# Patient Record
Sex: MALE | Race: WHITE | NOT HISPANIC OR LATINO
[De-identification: names, ages, dates, MRNs, and addresses within clinical notes are randomized per-mention and may not be internally consistent; named-entity substitution may affect disease eponyms.]

---

## 2017-07-06 PROBLEM — Z00.00 ENCOUNTER FOR PREVENTIVE HEALTH EXAMINATION: Status: ACTIVE | Noted: 2017-07-06

## 2017-07-10 ENCOUNTER — APPOINTMENT (OUTPATIENT)
Dept: ORTHOPEDIC SURGERY | Facility: CLINIC | Age: 37
End: 2017-07-10

## 2017-07-11 ENCOUNTER — OTHER (OUTPATIENT)
Age: 37
End: 2017-07-11

## 2017-07-17 ENCOUNTER — APPOINTMENT (OUTPATIENT)
Dept: ORTHOPEDIC SURGERY | Facility: CLINIC | Age: 37
End: 2017-07-17

## 2017-07-17 VITALS
BODY MASS INDEX: 27.92 KG/M2 | SYSTOLIC BLOOD PRESSURE: 137 MMHG | TEMPERATURE: 99 F | WEIGHT: 195 LBS | DIASTOLIC BLOOD PRESSURE: 90 MMHG | HEART RATE: 63 BPM | HEIGHT: 70 IN

## 2017-07-17 DIAGNOSIS — Z78.9 OTHER SPECIFIED HEALTH STATUS: ICD-10-CM

## 2017-09-18 ENCOUNTER — APPOINTMENT (OUTPATIENT)
Dept: ORTHOPEDIC SURGERY | Facility: CLINIC | Age: 37
End: 2017-09-18

## 2019-01-03 ENCOUNTER — APPOINTMENT (OUTPATIENT)
Dept: ORTHOPEDIC SURGERY | Facility: CLINIC | Age: 39
End: 2019-01-03
Payer: COMMERCIAL

## 2019-01-03 VITALS
DIASTOLIC BLOOD PRESSURE: 89 MMHG | WEIGHT: 195 LBS | HEIGHT: 70 IN | SYSTOLIC BLOOD PRESSURE: 142 MMHG | HEART RATE: 78 BPM | BODY MASS INDEX: 27.92 KG/M2

## 2019-01-03 PROCEDURE — 73030 X-RAY EXAM OF SHOULDER: CPT | Mod: RT

## 2019-01-03 PROCEDURE — 99213 OFFICE O/P EST LOW 20 MIN: CPT

## 2019-03-11 ENCOUNTER — APPOINTMENT (OUTPATIENT)
Dept: ORTHOPEDIC SURGERY | Facility: CLINIC | Age: 39
End: 2019-03-11
Payer: COMMERCIAL

## 2019-03-11 VITALS
TEMPERATURE: 99.1 F | WEIGHT: 195 LBS | HEART RATE: 72 BPM | DIASTOLIC BLOOD PRESSURE: 81 MMHG | SYSTOLIC BLOOD PRESSURE: 137 MMHG | BODY MASS INDEX: 27.92 KG/M2 | HEIGHT: 70 IN

## 2019-03-11 PROCEDURE — 99213 OFFICE O/P EST LOW 20 MIN: CPT

## 2019-03-11 NOTE — PHYSICAL EXAM
[de-identified] : Physical Exam:\par General: Well appearing, no acute distress, A&O\par Neurologic: A&Ox3, No focal deficits\par Head: NCAT without abrasions, lacerations, or ecchymosis to head, face, or scalp\par Eyes: No scleral icterus, no gross abnormalities\par Respiratory: Equal chest wall expansion bilaterally, no accessory muscle use\par Lymphatic: No lymphadenopathy palpated\par Skin: Warm and dry\par Psychiatric: Normal mood and affect\par Right Shoulder\par ·	Inspection/Palpation: no tenderness, swelling or deformities\par ·	Range of Motion: full and painless in all planes, no crepitus\par ·	Strength: forward elevation in scapular plane 5/5, internal rotation 5/5, external rotation 5/5, adduction 5/5 and abduction 5/5\par ·	Stability: no joint instability on provocative testing\par Tests: Perkins test negative, Neer sign negative, negative drop arm test secondary to pain, bear hug test negative, Napolean sign negative, cross arm adduction negative, lift off sign positive, hornblowers sign negative, speeds test negative, Yergason's test negative, no bicipital groove tenderness, Burns's Active Compression test negative\par \par Left Shoulder\par ·	Inspection/Palpation: no tenderness, swelling or deformities\par ·	Range of Motion: full and painless in all planes, no crepitus\par ·	Strength: forward elevation in scapular plane 5/5, internal rotation 5/5, external rotation 5/5, adduction 5/5 and abduction 5/5\par ·	Stability: no joint instability on provocative testing\par Tests: Perkins test negative, Neer sign negative, negative drop arm test secondary to pain, bear hug test negative, Napolean sign negative, cross arm adduction negative, lift off sign positive, hornblowers sign negative, speeds test negative, Yergason's test negative, no bicipital groove tenderness, Burns's Active Compression test negative

## 2019-03-11 NOTE — DISCUSSION/SUMMARY
[de-identified] : Ammon is a 38-year-old male coming in for followup after complaining of significant right shoulder pain. He has been in no pain since resting it after the injury in December. He has full range of motion of shoulder with no pain on exam. At this time he may continue to go back to working out. I will see him back p.r.n. He agrees with the above plan and all questions were

## 2019-03-11 NOTE — HISTORY OF PRESENT ILLNESS
[de-identified] : Pt. is 38 y.o RHD male with c/o R shoulder pain that began several years ago.  Patient painted his ceiling following working out at the gym and experienced R shoulder pain.  He performed physical therapy which alleviated the pain.  Recently he was performing IR/ER with a weight in his hand to warm up his shoulder at the gym and his shoulder pain returned.  Pt. was seen by Rylan MENDOZA who referred him to Dr. Luciano to obtain  a rx. for physical therapy.  Currently the patient is not experiencing pain.

## 2019-04-08 ENCOUNTER — APPOINTMENT (OUTPATIENT)
Dept: ORTHOPEDIC SURGERY | Facility: CLINIC | Age: 39
End: 2019-04-08
Payer: COMMERCIAL

## 2019-04-08 PROCEDURE — 99213 OFFICE O/P EST LOW 20 MIN: CPT

## 2019-04-08 NOTE — REVIEW OF SYSTEMS
[Joint Pain] : joint pain [Joint Stiffness] : joint stiffness [Negative] : Heme/Lymph [FreeTextEntry9] : right shoulder

## 2019-04-08 NOTE — HISTORY OF PRESENT ILLNESS
[de-identified] : Ammon comes in for followup of his right shoulder. He states he's had significant pain in his shoulder with catching and locking while going back exercises. Overall is unhappy with my 2 get further imaging to assess his shoulder. He has had this pain and symptoms for the past 6 months.

## 2019-04-08 NOTE — DISCUSSION/SUMMARY
[de-identified] : 38-year-old male right shoulder pain secondary to a possible SLAP tear versus rotator cuff tendinitis. He has attempted conservative measures over the last 6 months with physical therapy and oral anti-inflammatories and is completely refractory. At this time I recommend an MRI to rule out a SLAP tear. I will call him once the MRI is completed to discuss the results and further treatment plans. He agrees with the above plan and all questions were answered

## 2019-04-08 NOTE — PHYSICAL EXAM
[de-identified] : Physical Exam:\par General: Well appearing, no acute distress, A&O\par Neurologic: A&Ox3, No focal deficits\par Head: NCAT without abrasions, lacerations, or ecchymosis to head, face, or scalp\par Eyes: No scleral icterus, no gross abnormalities\par Respiratory: Equal chest wall expansion bilaterally, no accessory muscle use\par Lymphatic: No lymphadenopathy palpated\par Skin: Warm and dry\par Psychiatric: Normal mood and affect\par Right Shoulder\par ·	Inspection/Palpation: no tenderness, swelling or deformities\par ·	Range of Motion: full and painless in all planes, no crepitus\par ·	Strength: forward elevation in scapular plane 5/5, internal rotation 5/5, external rotation 5/5, adduction 5/5 and abduction 5/5\par ·	Stability: no joint instability on provocative testing\par Tests: Perkins test negative, Neer sign negative, negative drop arm test secondary to pain, bear hug test negative, Napolean sign negative, cross arm adduction negative, lift off sign positive, hornblowers sign negative, speeds test negative, Yergason's test negative, no bicipital groove tenderness, Burns's Active Compression test negative\par \par Left Shoulder\par ·	Inspection/Palpation: no tenderness, swelling or deformities\par ·	Range of Motion: full and painless in all planes, no crepitus\par ·	Strength: forward elevation in scapular plane 5/5, internal rotation 5/5, external rotation 5/5, adduction 5/5 and abduction 5/5\par ·	Stability: no joint instability on provocative testing\par Tests: Perkins test negative, Neer sign negative, negative drop arm test secondary to pain, bear hug test negative, Napolean sign negative, cross arm adduction negative, lift off sign positive, hornblowers sign negative, speeds test negative, Yergason's test negative, no bicipital groove tenderness, Burns's Active Compression test negative

## 2019-04-16 ENCOUNTER — APPOINTMENT (OUTPATIENT)
Dept: MRI IMAGING | Facility: CLINIC | Age: 39
End: 2019-04-16

## 2019-05-05 ENCOUNTER — FORM ENCOUNTER (OUTPATIENT)
Age: 39
End: 2019-05-05

## 2019-05-06 ENCOUNTER — APPOINTMENT (OUTPATIENT)
Dept: MRI IMAGING | Facility: CLINIC | Age: 39
End: 2019-05-06
Payer: COMMERCIAL

## 2019-05-06 ENCOUNTER — OUTPATIENT (OUTPATIENT)
Dept: OUTPATIENT SERVICES | Facility: HOSPITAL | Age: 39
LOS: 1 days | End: 2019-05-06
Payer: COMMERCIAL

## 2019-05-06 DIAGNOSIS — S46.911A STRAIN OF UNSPECIFIED MUSCLE, FASCIA AND TENDON AT SHOULDER AND UPPER ARM LEVEL, RIGHT ARM, INITIAL ENCOUNTER: ICD-10-CM

## 2019-05-06 PROCEDURE — 73221 MRI JOINT UPR EXTREM W/O DYE: CPT | Mod: 26,RT

## 2019-05-06 PROCEDURE — 73221 MRI JOINT UPR EXTREM W/O DYE: CPT

## 2020-08-31 ENCOUNTER — APPOINTMENT (OUTPATIENT)
Dept: ORTHOPEDIC SURGERY | Facility: CLINIC | Age: 40
End: 2020-08-31
Payer: COMMERCIAL

## 2020-08-31 PROCEDURE — 99214 OFFICE O/P EST MOD 30 MIN: CPT

## 2020-08-31 RX ORDER — MELOXICAM 7.5 MG/1
7.5 TABLET ORAL
Qty: 21 | Refills: 0 | Status: COMPLETED | COMMUNITY
Start: 2020-08-31 | End: 2020-09-21

## 2020-08-31 NOTE — PHYSICAL EXAM
[de-identified] : Physical Exam:\par General: Well appearing, no acute distress, A&O\par Neurologic: A&Ox3, No focal deficits\par Head: NCAT without abrasions, lacerations, or ecchymosis to head, face, or scalp\par Eyes: No scleral icterus, no gross abnormalities\par Respiratory: Equal chest wall expansion bilaterally, no accessory muscle use\par Lymphatic: No lymphadenopathy palpated\par Skin: Warm and dry\par Psychiatric: Normal mood and affect\par Right Shoulder\par ·	Inspection/Palpation: no tenderness, swelling or deformities\par ·	Range of Motion: full and painless in all planes, no crepitus\par ·	Strength: forward elevation in scapular plane 5/5, internal rotation 5/5, external rotation 5/5, adduction 5/5 and abduction 5/5\par ·	Stability: no joint instability on provocative testing\par Tests: Perkins test negative, Neer sign negative, negative drop arm test secondary to pain, bear hug test negative, Napolean sign negative, cross arm adduction negative, lift off sign positive, hornblowers sign negative, speeds test negative, Yergason's test negative, no bicipital groove tenderness, Burns's Active Compression test negative\par \par Left Shoulder\par ·	Inspection/Palpation: no tenderness, swelling or deformities\par ·	Range of Motion: full and painless in all planes, no crepitus\par ·	Strength: forward elevation in scapular plane 5/5, internal rotation 5/5, external rotation 5/5, adduction 5/5 and abduction 5/5\par ·	Stability: no joint instability on provocative testing\par Tests: Perkins test negative, Neer sign negative, negative drop arm test secondary to pain, bear hug test negative, Napolean sign negative, cross arm adduction negative, lift off sign positive, hornblowers sign negative, speeds test negative, Yergason's test negative, no bicipital groove tenderness, Burns's Active Compression test negative [de-identified] : MRI to right shoulder:   Tendinosis of the supraspinatus without tear. No rotator cuff tear.  * SLAP tear as described above with associated superior paralabral cyst measuring 0.4 x 0.6 x 0.7 cm.

## 2020-08-31 NOTE — DISCUSSION/SUMMARY
[de-identified] : Ammon comes in for followup of his right shoulder. He states he's had improvement in his symptoms since performing PT and requests a new script at this time. He has not seen us since April. He wasn't able to perform PT 2x/week since seeing us last due to COVID. He states his therapist tells him he is "tight" with forward flexion. His pain is dull and deep with the joint. He admits to clicking of his shoulder regularly. He admits to working as a  and denies constant pain but with lifting at work it hurts him. He denies injection or Mobic for this issue. Due to this, after a thorough discussion regarding the nature of his symptoms and all treatment options, pt requires Mobic x 21 days alongside restarting formal PT. If this doesn’t alleviate his symptoms entirely he will perform the u/s guided injection that was ordered for him. He was given these prescriptions today. We will see him back for clinical reassessment in 6-8 weeks. If he is refractory, we will discuss surgery. He agrees with the above plan and all questions were answered.\par

## 2020-08-31 NOTE — HISTORY OF PRESENT ILLNESS
[de-identified] : Ammon comes in for followup of his right shoulder. He states he's had improvement in his symptoms since performing PT and requests a new script at this time. He has not seen us since April. He wasn't able to perform PT 2x/week since seeing us last due to COVID. He states his therapist tells him he is "tight" with forward flexion. His pain is dull and deep with the joint. He admits to clicking of his shoulder regularly. He admits to working as a  and denies constant pain but with lifting at work it hurts him. He denies injection or Mobic for this issue.

## 2020-08-31 NOTE — REVIEW OF SYSTEMS
[Joint Pain] : joint pain [Joint Stiffness] : joint stiffness [Negative] : Endocrine [FreeTextEntry9] : right shoulder

## 2020-09-15 ENCOUNTER — RESULT REVIEW (OUTPATIENT)
Age: 40
End: 2020-09-15

## 2020-09-15 ENCOUNTER — APPOINTMENT (OUTPATIENT)
Dept: ULTRASOUND IMAGING | Facility: CLINIC | Age: 40
End: 2020-09-15
Payer: COMMERCIAL

## 2020-09-15 ENCOUNTER — OUTPATIENT (OUTPATIENT)
Dept: OUTPATIENT SERVICES | Facility: HOSPITAL | Age: 40
LOS: 1 days | End: 2020-09-15
Payer: COMMERCIAL

## 2020-09-15 DIAGNOSIS — Z00.8 ENCOUNTER FOR OTHER GENERAL EXAMINATION: ICD-10-CM

## 2020-09-15 DIAGNOSIS — S43.431D SUPERIOR GLENOID LABRUM LESION OF RIGHT SHOULDER, SUBSEQUENT ENCOUNTER: ICD-10-CM

## 2020-09-15 DIAGNOSIS — M67.819 OTHER SPECIFIED DISORDERS OF SYNOVIUM AND TENDON, UNSPECIFIED SHOULDER: ICD-10-CM

## 2020-09-15 DIAGNOSIS — S73.191D OTHER SPRAIN OF RIGHT HIP, SUBSEQUENT ENCOUNTER: ICD-10-CM

## 2020-09-15 PROCEDURE — 20611 DRAIN/INJ JOINT/BURSA W/US: CPT

## 2020-09-15 PROCEDURE — 20611 DRAIN/INJ JOINT/BURSA W/US: CPT | Mod: RT

## 2020-11-10 ENCOUNTER — NON-APPOINTMENT (OUTPATIENT)
Age: 40
End: 2020-11-10

## 2020-11-12 ENCOUNTER — APPOINTMENT (OUTPATIENT)
Dept: ORTHOPEDIC SURGERY | Facility: CLINIC | Age: 40
End: 2020-11-12

## 2020-11-17 ENCOUNTER — NON-APPOINTMENT (OUTPATIENT)
Age: 40
End: 2020-11-17

## 2020-11-24 ENCOUNTER — APPOINTMENT (OUTPATIENT)
Dept: ORTHOPEDIC SURGERY | Facility: CLINIC | Age: 40
End: 2020-11-24

## 2020-12-14 ENCOUNTER — APPOINTMENT (OUTPATIENT)
Dept: ORTHOPEDIC SURGERY | Facility: CLINIC | Age: 40
End: 2020-12-14
Payer: COMMERCIAL

## 2020-12-14 PROCEDURE — 99214 OFFICE O/P EST MOD 30 MIN: CPT

## 2020-12-14 PROCEDURE — 99072 ADDL SUPL MATRL&STAF TM PHE: CPT

## 2020-12-17 NOTE — PHYSICAL EXAM
[de-identified] : Physical Exam:\par General: Well appearing, no acute distress\par Neurologic: A&Ox3, No focal deficits\par Head: NCAT without abrasions, lacerations, or ecchymosis to head, face, or scalp\par Eyes: No scleral icterus, no gross abnormalities\par Respiratory: Equal chest wall expansion bilaterally, no accessory muscle use\par Lymphatic: No lymphadenopathy palpated\par Skin: Warm and dry\par Psychiatric: Normal mood and affect\par \par Right Shoulder:\par ·	Inspection/Palpation: no tenderness, swelling or deformities\par ·	Range of Motion: full and painless in all planes, no crepitus; FF 0-170; ER at side 45; IR to T7\par ·	Strength: forward elevation in scapular plane [4/5], internal rotation [4/5], external rotation [4/5], adduction [4/5] and abduction [4/5]\par ·	Stability: load and shift test positive, apprehension test positive, relocation test positive, sulcus sign negative; Susi test [negative/positive], Jerk test [negative/positive]\par ·	Tests: Perkins negative, Neer negative, negative drop arm test, bear hug test negative, Napolean sign negative, cross arm adduction [negative/positive], lift off sign negative, hornblowers sign negative, speeds test [positive/negative], Yergason's test [positive/negative], bicipital groove tenderness [positive/negative], Burns's Active Compression test [positive/negative], bicep's load II test [negative/positive]\par \par Left Shoulder:\par ·	Inspection/Palpation: no tenderness, swelling or deformities\par ·	Range of Motion: full and painless in all planes, no crepitus\par ·	Strength: forward elevation in scapular plane 5/5, internal rotation 5/5, external rotation 5/5, adduction 5/5 and abduction 5/5\par ·	Stability: no joint instability on provocative testing\par ·	Tests: Perkins test negative, Neer sign negative, negative drop arm test secondary to pain, bear hug test negative, Napolean sign negative, cross arm adduction negative, lift off sign positive, hornblowers sign negative, speeds test negative, Yergason's test negative, no bicipital groove tenderness, Burns's Active Compression test negative\par  [de-identified] : MRI to right shoulder:   Tendinosis of the supraspinatus without tear. No rotator cuff tear.  * SLAP tear as described above with associated superior paralabral cyst measuring 0.4 x 0.6 x 0.7 cm.

## 2020-12-17 NOTE — DISCUSSION/SUMMARY
[de-identified] : NAMITA is a 40 year male who presents today for follow up for right shoulder pain that has not improved with conservative management. Patient is here to discuss surgical intervention at this time. Patient admits to PT ending in September without improvement in his symptoms. He took mobic at that time as well. He denies cortisone injections in the past for this complaint. He admits to working as a  and denies constant pain but with lifting at work it hurts him. Previously reviewed MRI to right shoulder revealed tendinosis of the supraspinatus without tear. No rotator cuff tear.  SLAP tear with associated superior paralabral cyst measuring 0.4 x 0.6 x 0.7 cm. Due to this, after a thorough discussion regarding the nature of his symptoms and all treatment options, pt at this time is considering surgical intervention.\par \par Patient has tried and failed all conservative treatment options. All the risks and benefits of a right shoulder arthroscopy with cyst removal, extensive debridement, SAD, acromioplasty, and open biceps tenodesis were discussed with the patient. Risks include, but are not limited to, infection, bleeding, dislocation, nerve injury, blood clots and other possible medical complications, which were discussed with the patient. Also the risks of possible readmission were discussed with the patient. Patient completely understands all risks and benefits. The need for postoperative DVT prophylaxis discussed with the patient as well. The patient was given no assurances that all the symptoms will be alleviated. Patient completely understands all this. He has been advised to get medical clearance before the procedure, in order to decrease complication risk. I had my surgical coordinator meet with the patient to discuss surgical dates. He agrees with the above plan and all questions were answered.

## 2020-12-17 NOTE — HISTORY OF PRESENT ILLNESS
[de-identified] : NAMITA is a 40 year male who presents today for follow up for right shoulder pain that has not improved with conservative management. Patient is here to discuss surgical intervention at this time. Patient admits to PT ending in September without improvement in his symptoms. He took mobic at that time as well. He denies cortisone injections in the past for this complaint.

## 2020-12-22 ENCOUNTER — APPOINTMENT (OUTPATIENT)
Dept: FAMILY MEDICINE | Facility: CLINIC | Age: 40
End: 2020-12-22
Payer: COMMERCIAL

## 2020-12-22 VITALS
BODY MASS INDEX: 28.06 KG/M2 | WEIGHT: 196 LBS | DIASTOLIC BLOOD PRESSURE: 80 MMHG | HEIGHT: 70 IN | OXYGEN SATURATION: 98 % | TEMPERATURE: 97.9 F | SYSTOLIC BLOOD PRESSURE: 130 MMHG | HEART RATE: 80 BPM

## 2020-12-22 DIAGNOSIS — Z13.31 ENCOUNTER FOR SCREENING FOR DEPRESSION: ICD-10-CM

## 2020-12-22 DIAGNOSIS — Z76.89 PERSONS ENCOUNTERING HEALTH SERVICES IN OTHER SPECIFIED CIRCUMSTANCES: ICD-10-CM

## 2020-12-22 PROCEDURE — G0442 ANNUAL ALCOHOL SCREEN 15 MIN: CPT

## 2020-12-22 PROCEDURE — 99204 OFFICE O/P NEW MOD 45 MIN: CPT | Mod: 25

## 2020-12-22 PROCEDURE — 99072 ADDL SUPL MATRL&STAF TM PHE: CPT

## 2020-12-22 NOTE — HISTORY OF PRESENT ILLNESS
[FreeTextEntry1] : establish care  [de-identified] : 40 year old male with pmhx of SLAP tear with cyst presents to establish care. He has surgery scheduled feb 12 and needs PCP. He otherwise feels well. Has no other concerns today. 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 Shoulder has been bothering him for years. Non invasive treatment has been unsuccessful.

## 2020-12-22 NOTE — PHYSICAL EXAM
[No Acute Distress] : no acute distress [Well-Appearing] : well-appearing [Normal Sclera/Conjunctiva] : normal sclera/conjunctiva [PERRL] : pupils equal round and reactive to light [Normal Outer Ear/Nose] : the outer ears and nose were normal in appearance [Normal TMs] : both tympanic membranes were normal [No Lymphadenopathy] : no lymphadenopathy [Supple] : supple [No Respiratory Distress] : no respiratory distress  [No Accessory Muscle Use] : no accessory muscle use [Clear to Auscultation] : lungs were clear to auscultation bilaterally [Normal Rate] : normal rate  [Regular Rhythm] : with a regular rhythm [Soft] : abdomen soft [Non Tender] : non-tender [Non-distended] : non-distended [Normal Bowel Sounds] : normal bowel sounds [No Rash] : no rash [Coordination Grossly Intact] : coordination grossly intact [No Focal Deficits] : no focal deficits [Normal Affect] : the affect was normal [Normal Insight/Judgement] : insight and judgment were intact

## 2020-12-22 NOTE — PLAN
[FreeTextEntry1] : SLAP tear\par - advised he will need PST and medical clearance appt. Will f/u with surgical coordinator \par \par HCM\par - encourage healthy diet and exercise

## 2020-12-22 NOTE — HEALTH RISK ASSESSMENT
[Yes] : Yes [Monthly or less (1 pt)] : Monthly or less (1 point) [1 or 2 (0 pts)] : 1 or 2 (0 points) [Never (0 pts)] : Never (0 points) [0] : 2) Feeling down, depressed, or hopeless: Not at all (0) [] : No [Audit-CScore] : 1 [de-identified] : limited, active at work [de-identified] : balanced  [[id number]] : 0

## 2021-02-01 ENCOUNTER — APPOINTMENT (OUTPATIENT)
Dept: FAMILY MEDICINE | Facility: CLINIC | Age: 41
End: 2021-02-01

## 2021-02-09 ENCOUNTER — APPOINTMENT (OUTPATIENT)
Dept: FAMILY MEDICINE | Facility: CLINIC | Age: 41
End: 2021-02-09
Payer: COMMERCIAL

## 2021-02-09 VITALS
WEIGHT: 195 LBS | HEART RATE: 76 BPM | SYSTOLIC BLOOD PRESSURE: 130 MMHG | HEIGHT: 70 IN | DIASTOLIC BLOOD PRESSURE: 82 MMHG | OXYGEN SATURATION: 98 % | TEMPERATURE: 97.9 F | BODY MASS INDEX: 27.92 KG/M2

## 2021-02-09 DIAGNOSIS — Z01.818 ENCOUNTER FOR OTHER PREPROCEDURAL EXAMINATION: ICD-10-CM

## 2021-02-09 PROCEDURE — 99072 ADDL SUPL MATRL&STAF TM PHE: CPT

## 2021-02-09 PROCEDURE — 99214 OFFICE O/P EST MOD 30 MIN: CPT

## 2021-02-09 NOTE — PHYSICAL EXAM
[No Acute Distress] : no acute distress [Well-Appearing] : well-appearing [Normal Sclera/Conjunctiva] : normal sclera/conjunctiva [PERRL] : pupils equal round and reactive to light [Normal Outer Ear/Nose] : the outer ears and nose were normal in appearance [Normal TMs] : both tympanic membranes were normal [No Lymphadenopathy] : no lymphadenopathy [Supple] : supple [No Respiratory Distress] : no respiratory distress  [No Accessory Muscle Use] : no accessory muscle use [Clear to Auscultation] : lungs were clear to auscultation bilaterally [Soft] : abdomen soft [Non Tender] : non-tender [Non-distended] : non-distended [Normal Bowel Sounds] : normal bowel sounds [No Joint Swelling] : no joint swelling [Grossly Normal Strength/Tone] : grossly normal strength/tone [No Rash] : no rash [No Focal Deficits] : no focal deficits [Normal Affect] : the affect was normal [Normal Insight/Judgement] : insight and judgment were intact

## 2021-02-09 NOTE — HISTORY OF PRESENT ILLNESS
[No Pertinent Cardiac History] : no history of aortic stenosis, atrial fibrillation, coronary artery disease, recent myocardial infarction, or implantable device/pacemaker [No Pertinent Pulmonary History] : no history of asthma, COPD, sleep apnea, or smoking [Self] : no previous adverse anesthesia reaction [Chronic Anticoagulation] : no chronic anticoagulation [Chronic Kidney Disease] : no chronic kidney disease [Diabetes] : no diabetes [FreeTextEntry1] : R shoulder arthroscopy  [FreeTextEntry2] : 2/12/21 [FreeTextEntry3] : Dr. Luciano  [FreeTextEntry4] : 40 year old male with no sig pmhx presents for medical clearance for R shoulder arthroscopy. He is feeling well today with no concerns. Able to walk up a flight of stairs without symptoms. Is very active daily.  [FreeTextEntry8] : METS > 4

## 2021-02-09 NOTE — ASSESSMENT
[Patient Optimized for Surgery] : Patient optimized for surgery [No Further Testing Recommended] : no further testing recommended [FreeTextEntry4] : Low risk pt for intermediate risk procedure. He is medically optimized at this time.

## 2021-02-12 ENCOUNTER — APPOINTMENT (OUTPATIENT)
Dept: ORTHOPEDIC SURGERY | Facility: AMBULATORY SURGERY CENTER | Age: 41
End: 2021-02-12
Payer: COMMERCIAL

## 2021-02-12 ENCOUNTER — NON-APPOINTMENT (OUTPATIENT)
Age: 41
End: 2021-02-12

## 2021-02-12 PROCEDURE — 29826 SHO ARTHRS SRG DECOMPRESSION: CPT | Mod: RT

## 2021-02-12 PROCEDURE — 23430 REPAIR BICEPS TENDON: CPT | Mod: RT

## 2021-02-12 PROCEDURE — 29807 SHO ARTHRS SRG RPR SLAP LES: CPT | Mod: RT

## 2021-02-12 RX ORDER — ACETAMINOPHEN 500 MG/1
500 TABLET ORAL
Qty: 30 | Refills: 0 | Status: COMPLETED | COMMUNITY
Start: 2021-02-12 | End: 2021-02-22

## 2021-02-12 RX ORDER — OXYCODONE 5 MG/1
5 TABLET ORAL
Qty: 10 | Refills: 0 | Status: COMPLETED | COMMUNITY
Start: 2021-02-12 | End: 2021-02-17

## 2021-02-12 RX ORDER — ONDANSETRON 4 MG/1
4 TABLET ORAL
Qty: 30 | Refills: 0 | Status: COMPLETED | COMMUNITY
Start: 2021-02-12 | End: 2021-02-17

## 2021-02-22 ENCOUNTER — APPOINTMENT (OUTPATIENT)
Dept: ORTHOPEDIC SURGERY | Facility: CLINIC | Age: 41
End: 2021-02-22
Payer: COMMERCIAL

## 2021-02-22 VITALS
DIASTOLIC BLOOD PRESSURE: 80 MMHG | SYSTOLIC BLOOD PRESSURE: 122 MMHG | WEIGHT: 195 LBS | HEIGHT: 70 IN | BODY MASS INDEX: 27.92 KG/M2 | HEART RATE: 84 BPM

## 2021-02-22 PROCEDURE — 99024 POSTOP FOLLOW-UP VISIT: CPT

## 2021-02-22 PROCEDURE — 73030 X-RAY EXAM OF SHOULDER: CPT | Mod: RT

## 2021-02-23 NOTE — HISTORY OF PRESENT ILLNESS
[Clean/Dry/Intact] : clean, dry and intact [Neuro Intact] : an unremarkable neurological exam [Vascular Intact] : ~T peripheral vascular exam normal [___ Weeks Post Op] : [unfilled] weeks post op [Chills] : no chills [Fever] : no fever [Nausea] : no nausea [Vomiting] : no vomiting [de-identified] : spo R shoulder arthroscopy and extensive debridement. DOS: 02/12/2021. [de-identified] : NAMITA ROSE is a 40 year male being seen for 1st p/o R shoulder arthroscopy and extensive debridement, 2 weeks ago.\par \par Patient denies numbness and tingling to the extremities. Patient reports discontinuing tylenol 6 days post surgery Patient has not started PT yet. [de-identified] : Right Shoulder\par \par Incisions are clean, dry and intact. No surrounding erythema. No drainage. Wound appears to be healing well. Unable to test ROM due to recent sx. Motor and sensation are intact distally. He has full range of motion of all fingers with capillary refill of <2 seconds throughout. He has good nerve function and median nerve, ulnar, radial, PIN, AIN. He has no sensory deficits over the C5-T1 nerve roots. Radial pulses 2+. Radial pulses 2+. Able to make an A-OK sign and thumbs up sign.\par  [de-identified] : 2 views of R shoulder were performed today and available for me to review. Results were discussed with the patient. They demonstrate no f/x, dislocation or other deformity. [de-identified] : NAMITA ROSE is a 40 year male being seen for 1st p/o R shoulder arthroscopy and extensive debridement, 2 weeks ago.\par \par Patient denies numbness and tingling to the extremities. Patient reports discontinuing tylenol 6 days post surgery Patient has not started PT yet.\par \par Surgical sites are clean dry and intact. The patient is to start PT at this time 2x/week until seeing us again in 4 weeks from now. He agrees with the above plan and all questions were answered.\par

## 2021-03-26 ENCOUNTER — APPOINTMENT (OUTPATIENT)
Dept: ORTHOPEDIC SURGERY | Facility: CLINIC | Age: 41
End: 2021-03-26
Payer: COMMERCIAL

## 2021-03-26 PROCEDURE — 99024 POSTOP FOLLOW-UP VISIT: CPT

## 2021-03-26 NOTE — HISTORY OF PRESENT ILLNESS
[___ Weeks Post Op] : [unfilled] weeks post op [Clean/Dry/Intact] : clean, dry and intact [Neuro Intact] : an unremarkable neurological exam [Vascular Intact] : ~T peripheral vascular exam normal [Doing Well] : is doing well [Excellent Pain Control] : has excellent pain control [Chills] : no chills [Fever] : no fever [Nausea] : no nausea [Vomiting] : no vomiting [de-identified] : spo R shoulder arthroscopy and extensive debridement. DOS: 02/12/2021. [de-identified] : NAMITA ROSE is a 40 year male being seen for 2nd  p/o R shoulder arthroscopy and extensive debridement, 6 weeks ago. He presents today out of his sling and is tolerating this well.\par \par Patient denies numbness and tingling to the extremities. He denies use of OTC pain medications. He reports going to PT 3x/week with improvement in ROM and pain. He is wondering when he will be able to go back to work as a . He has been avoiding internal rotation with therapy. [de-identified] : Right Shoulder\par \par Incisions are clean, dry and intact. No surrounding erythema. No drainage. Spitting stitch noted to anterior portal and posterior portal site. Removed under sterile technique and tolerated well with no sequelae. Wound appears to be healing well. Passive FF 0-100, ER to 10, IR not tested. Motor and sensation are intact distally. He has full range of motion of all fingers with capillary refill of <2 seconds throughout. He has good nerve function and median nerve, ulnar, radial, PIN, AIN. He has no sensory deficits over the C5-T1 nerve roots. Radial pulses 2+. Able to make an A-OK sign and thumbs up sign: able to flex/extend thumb, able to cross middle over index,  strength 5/5\par  [de-identified] : No new imaging obtained today. [de-identified] : NAMITA ROSE is a 40 year male being seen for 2nd p/o R shoulder arthroscopy and extensive debridement, 6 weeks ago. He presents today out of his sling and is tolerating this well.\par \par Patient denies numbness and tingling to the extremities. He denies use of OTC pain medications. He reports going to PT 3x/week with improvement in ROM and pain. He is wondering when he will be able to go back to work as a . He has been avoiding internal rotation with therapy.\par \par Surgical sites are clean dry and intact. The patient is to continue PT at this time 2x/week until seeing us again in 6 weeks from now. He agrees with the above plan and all questions were answered.

## 2021-04-23 ENCOUNTER — APPOINTMENT (OUTPATIENT)
Dept: FAMILY MEDICINE | Facility: CLINIC | Age: 41
End: 2021-04-23
Payer: COMMERCIAL

## 2021-04-23 PROCEDURE — 99213 OFFICE O/P EST LOW 20 MIN: CPT | Mod: 95

## 2021-04-23 NOTE — PLAN
[FreeTextEntry1] : Sinusitis\par - start abx and medrol dose broderick. \par - cont flonase and decongestant\par - Cont supportive measures including increased fluid intake\par - f/u if no relief\par

## 2021-04-23 NOTE — HISTORY OF PRESENT ILLNESS
[Home] : at home, [unfilled] , at the time of the visit. [Medical Office: ([facility])___] : at the medical office located in  [Verbal consent obtained from patient] : the patient, [unfilled] [FreeTextEntry8] : 40 year old male presents with concerns of sinus infection. Has had nasal pressure and R eye pressure for 2 weeks and it has been getting worse. Has been taking advil and nasal spray without relief . No fever or muscle aches. No cough

## 2021-05-03 ENCOUNTER — APPOINTMENT (OUTPATIENT)
Dept: ORTHOPEDIC SURGERY | Facility: CLINIC | Age: 41
End: 2021-05-03
Payer: COMMERCIAL

## 2021-05-03 VITALS
SYSTOLIC BLOOD PRESSURE: 131 MMHG | WEIGHT: 195 LBS | DIASTOLIC BLOOD PRESSURE: 85 MMHG | HEIGHT: 70 IN | HEART RATE: 77 BPM | BODY MASS INDEX: 27.92 KG/M2

## 2021-05-03 DIAGNOSIS — S43.431D SUPERIOR GLENOID LABRUM LESION OF RIGHT SHOULDER, SUBSEQUENT ENCOUNTER: ICD-10-CM

## 2021-05-03 PROCEDURE — 99024 POSTOP FOLLOW-UP VISIT: CPT

## 2021-05-03 NOTE — HISTORY OF PRESENT ILLNESS
[___ Weeks Post Op] : [unfilled] weeks post op [Clean/Dry/Intact] : clean, dry and intact [Neuro Intact] : an unremarkable neurological exam [Vascular Intact] : ~T peripheral vascular exam normal [Doing Well] : is doing well [Excellent Pain Control] : has excellent pain control [Chills] : no chills [Fever] : no fever [Nausea] : no nausea [Vomiting] : no vomiting [de-identified] : spo R shoulder arthroscopy and extensive debridement. DOS: 02/12/2021. [de-identified] : NAMITA ROSE is a 40 year male being seen for 3rd  p/o R shoulder arthroscopy and extensive debridement, 11 weeks ago. He presents today out of his sling and is tolerating this well.\par \par Patient denies numbness and tingling to the extremities. He denies use of OTC pain medications. He reports going to PT 3x/week with improvement in ROM and pain. He is wondering when he will be able to go back to work as a . He has been avoiding internal rotation with therapy. [de-identified] : Right Shoulder\par \par Incisions are clean, dry and intact. No surrounding erythema. No drainage. Spitting stitch noted to anterior portal and posterior portal site. Removed under sterile technique and tolerated well with no sequelae. Wound appears to be healing well. Passive FF 0-100, ER to 10, IR not tested. Motor and sensation are intact distally. He has full range of motion of all fingers with capillary refill of <2 seconds throughout. He has good nerve function and median nerve, ulnar, radial, PIN, AIN. He has no sensory deficits over the C5-T1 nerve roots. Radial pulses 2+. Able to make an A-OK sign and thumbs up sign: able to flex/extend thumb, able to cross middle over index,  strength 5/5\par  [de-identified] : No new imaging obtained today. [de-identified] : NAMITA ROSE is a 40 year male being seen for 3rd p/o R shoulder arthroscopy and extensive debridement, 11 weeks ago. He presents today out of his sling and is tolerating this well. He feels stronger on the right side of his surgical arm now than his left. He reports pain at times of abduction but otherwise doing extremely well in PT.\par \par Patient denies numbness and tingling to the extremities. He denies use of OTC pain medications. He reports going to PT 3x/week with improvement in ROM and pain. \par \par Surgical sites are clean dry and intact. The patient is to continue PT at this time 2x/week until seeing us again in 2 months alongside daily HEP from now. He may return back to work without restrictions at this time due to his improvements but advised not to do anything that will cause him pain. He should try to avoid heavy lifting or pushing of which he states he will be able to. He agrees with the above plan and all questions were answered.

## 2021-07-12 ENCOUNTER — APPOINTMENT (OUTPATIENT)
Dept: ORTHOPEDIC SURGERY | Facility: CLINIC | Age: 41
End: 2021-07-12
Payer: COMMERCIAL

## 2021-07-12 VITALS
SYSTOLIC BLOOD PRESSURE: 138 MMHG | HEART RATE: 71 BPM | WEIGHT: 195 LBS | DIASTOLIC BLOOD PRESSURE: 78 MMHG | HEIGHT: 70 IN | BODY MASS INDEX: 27.92 KG/M2

## 2021-07-12 PROCEDURE — 99072 ADDL SUPL MATRL&STAF TM PHE: CPT

## 2021-07-12 PROCEDURE — 99214 OFFICE O/P EST MOD 30 MIN: CPT

## 2021-07-12 RX ORDER — METHYLPREDNISOLONE 4 MG/1
4 TABLET ORAL
Qty: 1 | Refills: 0 | Status: DISCONTINUED | COMMUNITY
Start: 2021-04-23 | End: 2021-07-12

## 2021-07-12 RX ORDER — AMOXICILLIN AND CLAVULANATE POTASSIUM 500; 125 MG/1; MG/1
500-125 TABLET, FILM COATED ORAL
Qty: 14 | Refills: 0 | Status: DISCONTINUED | COMMUNITY
Start: 2021-04-23 | End: 2021-07-12

## 2021-07-12 NOTE — REVIEW OF SYSTEMS
[Negative] : Heme/Lymph [Joint Pain] : no joint pain [Joint Stiffness] : no joint stiffness [FreeTextEntry9] : aftercare: right shoulder

## 2021-07-12 NOTE — PHYSICAL EXAM
[de-identified] : Physical Exam:\par General: Well appearing, no acute distress\par Neurologic: A&Ox3, No focal deficits\par Head: NCAT without abrasions, lacerations, or ecchymosis to head, face, or scalp\par Eyes: No scleral icterus, no gross abnormalities\par Respiratory: Equal chest wall expansion bilaterally, no accessory muscle use\par Lymphatic: No lymphadenopathy palpated\par Skin: Warm and dry\par Psychiatric: Normal mood and affect\par \par Right Shoulder\par \par Incisions are clean, dry and intact. No surrounding erythema. No drainage. Wound fully healed well. Active FF 0-160, ER to 40, IR back pocket without pain. Motor and sensation are intact distally. He has full range of motion of all fingers with capillary refill of <2 seconds throughout. He has good nerve function and median nerve, ulnar, radial, PIN, AIN. He has no sensory deficits over the C5-T1 nerve roots. Radial pulses 2+. Able to make an A-OK sign and thumbs up sign: able to flex/extend thumb, able to cross middle over index,  strength 5/5\par \par Full ROM Wrist/ Hand\par

## 2021-07-12 NOTE — DISCUSSION/SUMMARY
[de-identified] : NAMITA ROSE is a 40 year male being seen for 4th  p/o R shoulder arthroscopy and extensive debridement, 5 months ago. He was discharged from PT at his last visit due to his insurance no longer covering it. He reports being back to work full duty but using his own precautions without pain or complaints. He is happy with his progress. We discussed today although he is doing well to continue with precautions and gradually increase his weightbearing. At 24 weeks he can safely increase to his toleration and work out on his own for general strengthening. We will see him in 2-3 months for clinical reassessment. He agrees with the above plan and all questions were answered.\par

## 2021-07-12 NOTE — HISTORY OF PRESENT ILLNESS
[___ Weeks Post Op] : [unfilled] weeks post op [Clean/Dry/Intact] : clean, dry and intact [Neuro Intact] : an unremarkable neurological exam [Vascular Intact] : ~T peripheral vascular exam normal [Doing Well] : is doing well [Excellent Pain Control] : has excellent pain control [Chills] : no chills [Fever] : no fever [Nausea] : no nausea [Vomiting] : no vomiting [de-identified] : spo R shoulder arthroscopy and extensive debridement. DOS: 02/12/2021. [de-identified] : NAMITA ROSE is a 40 year male being seen for 3rd  p/o R shoulder arthroscopy and extensive debridement, 11 weeks ago. He presents today out of his sling and is tolerating this well.\par \par Patient denies numbness and tingling to the extremities. He denies use of OTC pain medications. He reports going to PT 3x/week with improvement in ROM and pain. He is wondering when he will be able to go back to work as a . He has been avoiding internal rotation with therapy. [de-identified] : Right Shoulder\par \par Incisions are clean, dry and intact. No surrounding erythema. No drainage. Spitting stitch noted to anterior portal and posterior portal site. Removed under sterile technique and tolerated well with no sequelae. Wound appears to be healing well. Passive FF 0-100, ER to 10, IR not tested. Motor and sensation are intact distally. He has full range of motion of all fingers with capillary refill of <2 seconds throughout. He has good nerve function and median nerve, ulnar, radial, PIN, AIN. He has no sensory deficits over the C5-T1 nerve roots. Radial pulses 2+. Able to make an A-OK sign and thumbs up sign: able to flex/extend thumb, able to cross middle over index,  strength 5/5\par  [de-identified] : No new imaging obtained today. [de-identified] : NAMITA ROSE is a 40 year male being seen for 3rd p/o R shoulder arthroscopy and extensive debridement, 11 weeks ago. He presents today out of his sling and is tolerating this well. He feels stronger on the right side of his surgical arm now than his left. He reports pain at times of abduction but otherwise doing extremely well in PT.\par \par Patient denies numbness and tingling to the extremities. He denies use of OTC pain medications. He reports going to PT 3x/week with improvement in ROM and pain. \par \par Surgical sites are clean dry and intact. The patient is to continue PT at this time 2x/week until seeing us again in 2 months alongside daily HEP from now. He may return back to work without restrictions at this time due to his improvements but advised not to do anything that will cause him pain. He should try to avoid heavy lifting or pushing of which he states he will be able to. He agrees with the above plan and all questions were answered.

## 2021-10-11 ENCOUNTER — APPOINTMENT (OUTPATIENT)
Dept: ORTHOPEDIC SURGERY | Facility: CLINIC | Age: 41
End: 2021-10-11
Payer: COMMERCIAL

## 2021-10-11 VITALS
BODY MASS INDEX: 27.92 KG/M2 | HEART RATE: 80 BPM | DIASTOLIC BLOOD PRESSURE: 80 MMHG | HEIGHT: 70 IN | SYSTOLIC BLOOD PRESSURE: 133 MMHG | WEIGHT: 195 LBS

## 2021-10-11 DIAGNOSIS — M67.819 OTHER SPECIFIED DISORDERS OF SYNOVIUM AND TENDON, UNSPECIFIED SHOULDER: ICD-10-CM

## 2021-10-11 PROCEDURE — 99213 OFFICE O/P EST LOW 20 MIN: CPT

## 2021-10-11 NOTE — ADDENDUM
[FreeTextEntry1] : Documented by Uziel Coleman acting as a scribe for Dr. Luciano on 10/11/2021. \par \par All medical record entries made by the Scribe were at my, Dr. Luciano's, direction and\par personally dictated by me on 10/11/2021. I have reviewed the chart and agree that the record\par accurately reflects my personal performance of the history, physical exam, procedure and imaging.

## 2021-10-11 NOTE — DISCUSSION/SUMMARY
[de-identified] : NAMITA ROSE is a 40 year male being seen for p/o R shoulder arthroscopy and extensive debridement, 8 months ago. Currently, he reports no shoulder pain. He has no complaints at this time and is happy with his progress. He has not started working out on his own due to his work. \par \par At this point, patient ROM has greatly improved, and his pain has subsided, he is doing well and happy with his progress so far. Conservative measures of treatment include rest until asymptomatic, activity avoidance, NSAID's PRN, application to ice to the area 2-3x daily for 20 minutes, with gradual return to activities. Patient will follow up in 4 months for repeat clinical assessment. All questions were answered and the patient verbalized understanding. The patient is in agreement with this treatment plan.

## 2021-10-11 NOTE — PHYSICAL EXAM
[de-identified] : Physical Exam:\par General: Well appearing, no acute distress\par Neurologic: A&Ox3, No focal deficits\par Head: NCAT without abrasions, lacerations, or ecchymosis to head, face, or scalp\par Eyes: No scleral icterus, no gross abnormalities\par Respiratory: Equal chest wall expansion bilaterally, no accessory muscle use\par Lymphatic: No lymphadenopathy palpated\par Skin: Warm and dry\par Psychiatric: Normal mood and affect\par \par Right Shoulder\par \par Incisions are clean, dry and intact. No surrounding erythema. No drainage. Wound fully healed well. Active FF 0-176, ER to 45, IR L3 without pain. Strength forward elevation in scapular plane 5/5, internal rotation 5/5, external rotation 5/5, adduction 5/5 and abduction 5/5  Motor and sensation are intact distally. He has full range of motion of all fingers with capillary refill of <2 seconds throughout. He has good nerve function and median nerve, ulnar, radial, PIN, AIN. He has no sensory deficits over the C5-T1 nerve roots. Radial pulses 2+. Able to make an A-OK sign and thumbs up sign: able to flex/extend thumb, able to cross middle over index,  strength 5/5\par \par Full ROM Wrist/ Hand\par

## 2021-10-11 NOTE — HISTORY OF PRESENT ILLNESS
[Improving] : improving [___ mths] : [unfilled] month(s) ago [0] : an average pain level of 0/10 [None] : No exacerbating factors are noted [Physical Therapy] : relieved by physical therapy [Rest] : relieved by rest [de-identified] : NAMITA ROSE is a 40 year male being seen for p/o R shoulder arthroscopy and extensive debridement, 8 months ago. Currently, he reports no shoulder pain. He has no complaints at this time and is happy with his progress. He has not started working out on his own due to his work.

## 2022-02-07 ENCOUNTER — APPOINTMENT (OUTPATIENT)
Dept: ORTHOPEDIC SURGERY | Facility: CLINIC | Age: 42
End: 2022-02-07
Payer: COMMERCIAL

## 2022-02-07 VITALS
BODY MASS INDEX: 27.92 KG/M2 | DIASTOLIC BLOOD PRESSURE: 79 MMHG | HEART RATE: 63 BPM | SYSTOLIC BLOOD PRESSURE: 134 MMHG | WEIGHT: 195 LBS | HEIGHT: 70 IN

## 2022-02-07 DIAGNOSIS — Z98.890 OTHER SPECIFIED POSTPROCEDURAL STATES: ICD-10-CM

## 2022-02-07 DIAGNOSIS — S43.431D SUPERIOR GLENOID LABRUM LESION OF RIGHT SHOULDER, SUBSEQUENT ENCOUNTER: ICD-10-CM

## 2022-02-07 PROCEDURE — 99213 OFFICE O/P EST LOW 20 MIN: CPT

## 2022-02-07 RX ORDER — ACETAMINOPHEN 500 MG/1
500 TABLET ORAL
Refills: 0 | Status: DISCONTINUED | COMMUNITY
Start: 2021-02-12 | End: 2022-02-07

## 2022-02-07 NOTE — PHYSICAL EXAM
[de-identified] : Physical Exam:\par General: Well appearing, no acute distress\par Neurologic: A&Ox3, No focal deficits\par Head: NCAT without abrasions, lacerations, or ecchymosis to head, face, or scalp\par Eyes: No scleral icterus, no gross abnormalities\par Respiratory: Equal chest wall expansion bilaterally, no accessory muscle use\par Lymphatic: No lymphadenopathy palpated\par Skin: Warm and dry\par Psychiatric: Normal mood and affect\par \par Right Shoulder\par \par Incisions are clean, dry and intact. No surrounding erythema. No drainage. Wound fully healed well. Active FF 0-176, ER to 45, IR L3 without pain. Strength forward elevation in scapular plane 5/5, internal rotation 5/5, external rotation 5/5, adduction 5/5 and abduction 5/5  Motor and sensation are intact distally. He has full range of motion of all fingers with capillary refill of <2 seconds throughout. He has good nerve function and median nerve, ulnar, radial, PIN, AIN. He has no sensory deficits over the C5-T1 nerve roots. Radial pulses 2+. Able to make an A-OK sign and thumbs up sign: able to flex/extend thumb, able to cross middle over index,  strength 5/5\par \par Full ROM Wrist/ Hand\par

## 2022-02-07 NOTE — DISCUSSION/SUMMARY
[de-identified] : NAMITA ROSE is a 40 year male being seen for p/o R shoulder arthroscopy and extensive debridement, 11 months ago. Currently, he reports significant relief. He is happy with progress thus far, and has no other complaints. \par \par At this point, patient ROM has greatly improved, and his pain has subsided, he is doing well and happy with his progress so far. He has not started weight training or exercising, which he is advised to do. Patient will follow up in 1 yr for repeat clinical assessment. All questions were answered and the patient verbalized understanding. The patient is in agreement with this treatment plan.

## 2022-02-07 NOTE — HISTORY OF PRESENT ILLNESS
[Improving] : improving [___ mths] : [unfilled] month(s) ago [0] : an average pain level of 0/10 [None] : No exacerbating factors are noted [Physical Therapy] : relieved by physical therapy [Rest] : relieved by rest [de-identified] : NAMITA ROSE is a 40 year male being seen for p/o R shoulder arthroscopy and extensive debridement, 11 months ago. Currently, he reports significant relief. He is happy with progress thus far, and has no other complaints.

## 2022-02-07 NOTE — ADDENDUM
[FreeTextEntry1] : Documented by Uziel Coleman acting as a scribe for Dr. Luciano on 02/07/2022. \par \par All medical record entries made by the Scribe were at my, Dr. Luciano's, direction and\par personally dictated by me on 02/07/2022. I have reviewed the chart and agree that the record\par accurately reflects my personal performance of the history, physical exam, procedure and imaging.

## 2022-02-25 ENCOUNTER — APPOINTMENT (OUTPATIENT)
Dept: FAMILY MEDICINE | Facility: CLINIC | Age: 42
End: 2022-02-25
Payer: COMMERCIAL

## 2022-02-25 ENCOUNTER — NON-APPOINTMENT (OUTPATIENT)
Age: 42
End: 2022-02-25

## 2022-02-25 DIAGNOSIS — J01.00 ACUTE MAXILLARY SINUSITIS, UNSPECIFIED: ICD-10-CM

## 2022-02-25 PROCEDURE — 99213 OFFICE O/P EST LOW 20 MIN: CPT | Mod: 95

## 2022-02-25 RX ORDER — METHYLPREDNISOLONE 4 MG/1
4 TABLET ORAL
Qty: 1 | Refills: 0 | Status: ACTIVE | COMMUNITY
Start: 2022-02-25 | End: 1900-01-01

## 2022-02-25 NOTE — HISTORY OF PRESENT ILLNESS
[FreeTextEntry8] : 41 year old male presents for sinus pressure, headache and congestion. He gets covid tested every Tuesday. No fever or body aches. Concerned as he has a very strange smell for past 2 days and colored mucous production

## 2022-05-17 ENCOUNTER — APPOINTMENT (OUTPATIENT)
Dept: FAMILY MEDICINE | Facility: CLINIC | Age: 42
End: 2022-05-17
Payer: COMMERCIAL

## 2022-05-17 DIAGNOSIS — J01.90 ACUTE SINUSITIS, UNSPECIFIED: ICD-10-CM

## 2022-05-17 PROCEDURE — 99213 OFFICE O/P EST LOW 20 MIN: CPT | Mod: 95

## 2022-05-17 RX ORDER — AMOXICILLIN AND CLAVULANATE POTASSIUM 500; 125 MG/1; MG/1
500-125 TABLET, FILM COATED ORAL
Qty: 14 | Refills: 0 | Status: ACTIVE | COMMUNITY
Start: 2022-02-25 | End: 1900-01-01

## 2022-05-17 RX ORDER — FLUTICASONE PROPIONATE 50 UG/1
50 SPRAY, METERED NASAL DAILY
Qty: 1 | Refills: 3 | Status: ACTIVE | COMMUNITY
Start: 2022-05-17 | End: 1900-01-01

## 2022-05-17 NOTE — PHYSICAL EXAM
[No Respiratory Distress] : no respiratory distress  [Normal] : affect was normal and insight and judgment were intact [de-identified] : nasal congestion PND

## 2022-05-17 NOTE — HISTORY OF PRESENT ILLNESS
[Home] : at home, [unfilled] , at the time of the visit. [Medical Office: ([facility])___] : at the medical office located in  [Verbal consent obtained from patient] : the patient, [unfilled] [FreeTextEntry8] : Sinus congestion for past 5 days. PND starting to notice off smell. Ear congestion\par No fever.\par Not using nasal spray or antihistamine.\par Tests weekly on Tuesday for COVID, negative today.

## 2022-05-17 NOTE — PLAN
[FreeTextEntry1] : OTC symptom relief, start antihistamine and use steroid nasal spray. Decongestant PRN.\par Hold antibiotics for increased symptoms.

## 2022-09-12 ENCOUNTER — APPOINTMENT (OUTPATIENT)
Dept: ORTHOPEDIC SURGERY | Facility: CLINIC | Age: 42
End: 2022-09-12

## 2022-09-12 DIAGNOSIS — S43.431D SUPERIOR GLENOID LABRUM LESION OF RIGHT SHOULDER, SUBSEQUENT ENCOUNTER: ICD-10-CM

## 2022-09-12 PROCEDURE — 99214 OFFICE O/P EST MOD 30 MIN: CPT

## 2022-09-12 NOTE — DISCUSSION/SUMMARY
[de-identified] : I had a lengthy discussion with the patient regarding their current condition.  At this time I recommended he return to physical therapy.  He was given a prescription for professional PT in [place].  I reached out to [facility] to discuss his case.  He would benefit from a transition from PT to home exercise program with some guidance on his home program.   He will follow-up with us as his symptoms dictate.  All questions were answered.

## 2022-09-12 NOTE — HISTORY OF PRESENT ILLNESS
[de-identified] : Patient is a 42-year-old male here today for follow-up evaluation of his right shoulder.  He is a year and a half status post right shoulder arthroscopy with biceps tenodesis and SLAP repair.  Patient reports he has no pain in the shoulder.  He was working out in the gym as he tried to return over the summer and began experiencing pain in the trapezial region.  He immediately discontinued his workouts.  He has not yet returned to the gym.  He works as a  at [employer], his brother is the assistant  surgeon.

## 2022-09-12 NOTE — PHYSICAL EXAM
[de-identified] : Physical Exam: \par General: Well appearing, no acute distress \par Neurologic: A&Ox3, No focal deficits \par Head: NCAT without abrasions, lacerations, or ecchymosis to head, face, or scalp \par Eyes: No scleral icterus, no gross abnormalities \par Respiratory: Equal chest wall expansion bilaterally, no accessory muscle use \par Lymphatic: No lymphadenopathy palpated \par Skin: Warm and dry \par Psychiatric: Normal mood and affect\par \par The right shoulder is examined and reveals well-healed incisions.  He has forward flexion 170 degrees external rotation to 90 degrees and internal rotation to a mid thoracic level.  This is similar to his contralateral side.  He has 4+ strength rotator cuff testing x3.  Negative Perkins and Neer's.  His compartments are soft and nontender.  He is grossly neurovascular intact distally.  He does have some mild tenderness along the trapezius with myositis.

## 2023-05-02 ENCOUNTER — APPOINTMENT (OUTPATIENT)
Dept: ORTHOPEDIC SURGERY | Facility: CLINIC | Age: 43
End: 2023-05-02
Payer: COMMERCIAL

## 2023-05-02 VITALS — WEIGHT: 190 LBS | HEIGHT: 70 IN | BODY MASS INDEX: 27.2 KG/M2

## 2023-05-02 PROCEDURE — 99204 OFFICE O/P NEW MOD 45 MIN: CPT

## 2023-05-02 PROCEDURE — 73030 X-RAY EXAM OF SHOULDER: CPT | Mod: RT

## 2023-05-02 NOTE — PHYSICAL EXAM
[de-identified] : RUE\par full active and passive motion\par no TTP\par + apprehension/relocation\par NVI distally\par no cervical TTP

## 2023-05-02 NOTE — HISTORY OF PRESENT ILLNESS
[de-identified] : The patient is a 42 year old R hand dominant M who presents today complaining of right shoulder.   \par Date of Injury/Onset: August 2022\par Pain:    At Rest:0/10  \par With Activity:  0/10  \par Mechanism of injury: no specific injury \par This is not  a Work Related Injury being treated under Worker's Compensation. \par This is not an athletic injury occurring associated with an interscholastic or organized sports team. \par Quality of symptoms: tightness, stiffness \par Improves with: PT \par Worse with: exercising, lifting \par Prior treatment: PT, SX, Dr Luciano\par Prior Imaging: MRI 2019\par Occupation:  \par Additional Information: right shoulder arthroscopy, labrum tear repair, cyst and bone spur 2/2021\par

## 2023-05-02 NOTE — DISCUSSION/SUMMARY
[de-identified] : arthrogram eval labrum\par Postop PT was cut short, rec continuing following results of arthrogram\par fu after arthrogram\par \par -----------------------------------------------\par Home Exercise\par The patient is instructed on a home exercise program.\par \par Activity Modification\par The patient was advised to modify their activities.\par \par Dx / Natural History\par The patient was advised of the diagnosis.  The natural history of the pathology was explained in full to the patient in layman's terms.  Several different treatment options were discussed and explained in full to the patient including the risks and benefits of both surgical and non-surgical treatments.  All questions and concerns were answered.\par \par Pain Guide Activities\par The patient was advised to let pain guide the gradual advancement of activities.\par \par RICE\par I explained to the patient that rest, ice, compression, and elevation would benefit them.  They may return to activity after follow-up or when they no longer have any pain.\par \par The patient's current medication management of their orthopedic diagnosis was reviewed today:\par (1) We discussed a comprehensive treatment plan that included possible pharmaceutical management involving the use of prescription strength medications including but not limited to options such as oral Naprosyn 500mg BID, once daily Meloxicam 15 mg, or 500-650 mg Tylenol versus over the counter oral medications and topical prescription NSAID Pennsaid vs over the counter Voltaren gel.\par (2) There is a moderate risk of morbidity with further treatment, especially from use of prescription strength medications and possible side effects of these medications which include upset stomach with oral medications, skin reactions to topical medications and cardiac/renal issues with long term use.\par (3) I recommended that the patient follow-up with their medical physician to discuss any significant specific potential issues with long term medication use such as interactions with current medications or with exacerbation of underlying medical comorbidities.\par (4) The benefits and risks associated with use of injectable, oral or topical, prescription and over the counter anti-inflammatory medications were discussed with the patient. The patient voiced understanding of the risks including but not limited to bleeding, stroke, kidney dysfunction, heart disease, and were referred to the black box warning label for further information.

## 2023-05-02 NOTE — DATA REVIEWED
[FreeTextEntry1] : 3 view R shoulder XR 5/2/23 [facility]\par unremarkable\par + bicortical button well placed

## 2023-05-03 RX ORDER — ALPRAZOLAM 0.5 MG/1
0.5 TABLET ORAL
Qty: 3 | Refills: 0 | Status: ACTIVE | COMMUNITY
Start: 2023-05-03 | End: 1900-01-01

## 2023-05-10 ENCOUNTER — RESULT REVIEW (OUTPATIENT)
Age: 43
End: 2023-05-10

## 2023-05-17 ENCOUNTER — APPOINTMENT (OUTPATIENT)
Dept: ORTHOPEDIC SURGERY | Facility: CLINIC | Age: 43
End: 2023-05-17
Payer: COMMERCIAL

## 2023-05-17 VITALS — HEIGHT: 70 IN | WEIGHT: 190 LBS | BODY MASS INDEX: 27.2 KG/M2

## 2023-05-17 DIAGNOSIS — S46.911A STRAIN OF UNSPECIFIED MUSCLE, FASCIA AND TENDON AT SHOULDER AND UPPER ARM LEVEL, RIGHT ARM, INITIAL ENCOUNTER: ICD-10-CM

## 2023-05-17 DIAGNOSIS — M25.511 PAIN IN RIGHT SHOULDER: ICD-10-CM

## 2023-05-17 DIAGNOSIS — S46.811A STRAIN OF OTHER MUSCLES, FASCIA AND TENDONS AT SHOULDER AND UPPER ARM LEVEL, RIGHT ARM, INITIAL ENCOUNTER: ICD-10-CM

## 2023-05-17 PROCEDURE — 99214 OFFICE O/P EST MOD 30 MIN: CPT

## 2023-05-17 NOTE — PHYSICAL EXAM
[de-identified] : RUE\par full active and passive motion\par no TTP\par + apprehension/relocation\par NVI distally\par no cervical TTP

## 2023-05-17 NOTE — HISTORY OF PRESENT ILLNESS
[de-identified] : The patient is a 42 year old R hand dominant M who presents today complaining of right shoulder.   \par Date of Injury/Onset: August 2022\par Pain:    At Rest:0/10  \par With Activity:  0/10  \par Mechanism of injury: no specific injury \par This is not  a Work Related Injury being treated under Worker's Compensation. \par This is not an athletic injury occurring associated with an interscholastic or organized sports team. \par Quality of symptoms: tightness, stiffness \par Improves with: \par Worse with: exercising, lifting \par Prior treatment: PT, SX, Dr Luciano\par Occupation:  \par Additional Information: right shoulder arthroscopy, labrum tear repair, cyst and bone spur 2/2021\par

## 2023-05-17 NOTE — DATA REVIEWED
[FreeTextEntry1] : 5/10/23\par [facility] MRI RIGHT SHOULDER\par IMPRESSION:\par Irregularity of the articular surface of the distal supraspinatus tendon,\par indicating presence of a low-grade partial tear versus postoperative changes. No\par high-grade rotator cuff tendon tears.\par \par Nonvisualization of the long head of the biceps tendon, possibly secondary to\par complete tear or biceps tenodesis.\par \par Truncation of the free of the posterior superior labrum. Irregularity of the\par anterior-inferior glenoid labrum consistent with scarring. The presence of a\par partial tear cannot be excluded.\par

## 2023-05-17 NOTE — DISCUSSION/SUMMARY
[de-identified] : Reviewed all images with patient. \par Physical therapy prescribed for strengthening and stretching. \par Patient will follow up as needed. \par \par \par -----------------------------------------------\par Home Exercise\par The patient is instructed on a home exercise program.\par \par REKHA JERRY Acting as a Scribe for Dr. Hyatt\par I, Rekha Jerry, attest that this documentation has been prepared under the direction and in the presence of Provider Etienne Hyatt MD.\par \par Activity Modification\par The patient was advised to modify their activities.\par \par Dx / Natural History\par The patient was advised of the diagnosis.  The natural history of the pathology was explained in full to the patient in layman's terms.  Several different treatment options were discussed and explained in full to the patient including the risks and benefits of both surgical and non-surgical treatments.  All questions and concerns were answered.\par \par Pain Guide Activities\par The patient was advised to let pain guide the gradual advancement of activities.\par \par RICE\par I explained to the patient that rest, ice, compression, and elevation would benefit them.  They may return to activity after follow-up or when they no longer have any pain.\par \par The patient's current medication management of their orthopedic diagnosis was reviewed today:\par (1) We discussed a comprehensive treatment plan that included possible pharmaceutical management involving the use of prescription strength medications including but not limited to options such as oral Naprosyn 500mg BID, once daily Meloxicam 15 mg, or 500-650 mg Tylenol versus over the counter oral medications and topical prescription NSAID Pennsaid vs over the counter Voltaren gel.\par (2) There is a moderate risk of morbidity with further treatment, especially from use of prescription strength medications and possible side effects of these medications which include upset stomach with oral medications, skin reactions to topical medications and cardiac/renal issues with long term use.\par (3) I recommended that the patient follow-up with their medical physician to discuss any significant specific potential issues with long term medication use such as interactions with current medications or with exacerbation of underlying medical comorbidities.\par (4) The benefits and risks associated with use of injectable, oral or topical, prescription and over the counter anti-inflammatory medications were discussed with the patient. The patient voiced understanding of the risks including but not limited to bleeding, stroke, kidney dysfunction, heart disease, and were referred to the black box warning label for further information.